# Patient Record
(demographics unavailable — no encounter records)

---

## 2024-10-09 NOTE — HISTORY OF PRESENT ILLNESS
[FreeTextEntry1] : 69 YO M seen  5/9/2023 as NPT for a several year onset of phimosis. He states that this has affected his urination, specifically weak stream,  but does not affect his erections. He denies any associated pain. He was advised to get a circumcision or dorsal slit procedure from a prior evaluation. He denies dysuria and gross hematuria. He denies history of kidney stones.  amlodipine, hctz, Losartan, rosuvastatin for HTN, cholesterol. BP high in the office, normal by history this AM, Patient denies any headache, dizziness or visual issues today. UA trace RBC IPSS 1 DARRION 18 ESTELLE 0  cc PSA: 4/25/23--1.84  The patient denies fevers, chills, nausea and or vomiting and no unexplained weight loss.  All pertinent parts of the patient PFSH (past medical, family and social histories), laboratory, radiological studies and physician notes were reviewed prior to starting the face to face portion of the  visit. Questionnaire results were discussed with patient. We discussed the physical exam findings, and he was noted to have severe phimosis. I recommended a circumcision and he will consider this and let us know if he would like to proceed. We discussed the indications for, risks, alternatives and chances for success with this approach. Regarding the microhematuria identified on UA dip today, a urine C+S and cytology were sent. If there are no new problems, I would like to see him in 3 months to reassess. 5/31/2023 Circumcision at [facility] today, tolerated well, significant adhesions to the glans and obliterated and adherent phrenulum.  Patient seen 6/6/2023 for post op visit. He is doing well postoperatively, incisions healing well. I advised that he continue to put antibiotic ointment around the incision. He is travelling for a few months and will follow up when he returns. C+S sent. C+S negative.  Patient seen 11/15/2023 for 6 month FU. He has no complaints and denied any gross hematuria.  UA trace RBC, trace protein. IPSS 3 Findings today confirm good surgical result from his circumcision earlier in the year. There is no residual phimosis. As for the urine today trace blood was also seen on dipstick today. I sent this for microscopic examination culture and cytology. If these return normal then we will follow-up in 6 months to reassess. At that time he will also be due for his yearly SIS and PSA determination. Urine cytology negative, results to patient by VM, if C+S also negative, then FU as planned 6 months. C+S > 100K Coag negative Staph. Patient is asymptomatic, no treatment indicated, FU as planned 6 months.   Patient seen 5/22/2024 to reassess. He told me that he recently was evaluated by cardiology in [place] and this evaluation was unremarkable. He has been having nocturia x 3-5 without any gross hematuria or dysuria, and feels that he urinates better during the day with a strong stream and an adequate volume. UA tr WBC IPSS 6  ESTELLE 0 DARRION 8 Evaluation today does not present to immediate reason for the patient's marked nocturia. We discussed further noninvasive testing with uroflowmetry and pelvic ultrasound and I recommended that he proceed with this. He will be traveling to several countries between now and the next 6 months and would like to delay such an evaluation until November. Since his symptoms are minimal and slightly tolerable, I am in agreement with this approach and plans were made for follow-up visit in November. In the meanwhile urine sent for culture blood for PSA. We will review these by phone and act appropriately.  PSA 2.45, up slightly from 1.84 on 4/25/2023, but still in the normal range. C+S >100K Coag negative Staph. Unable to give results to patient by phone or VM, we will repeat PSA when he returns as planned in November 2024.  Patient seen TODAY 10/9/2024 to reassess.

## 2025-05-16 NOTE — HISTORY OF PRESENT ILLNESS
[FreeTextEntry1] : 69 YO M seen  5/9/2023 as NPT for a several year onset of phimosis. He states that this has affected his urination, specifically weak stream,  but does not affect his erections. He denies any associated pain. He was advised to get a circumcision or dorsal slit procedure from a prior evaluation. He denies dysuria and gross hematuria. He denies history of kidney stones.  amlodipine, hctz, Losartan, rosuvastatin for HTN, cholesterol. BP high in the office, normal by history this AM, Patient denies any headache, dizziness or visual issues today. UA trace RBC IPSS 1 DARRION 18 ESTELLE 0  cc PSA: 4/25/23--1.84  The patient denies fevers, chills, nausea and or vomiting and no unexplained weight loss.  All pertinent parts of the patient PFSH (past medical, family and social histories), laboratory, radiological studies and physician notes were reviewed prior to starting the face to face portion of the  visit. Questionnaire results were discussed with patient. We discussed the physical exam findings, and he was noted to have severe phimosis. I recommended a circumcision and he will consider this and let us know if he would like to proceed. We discussed the indications for, risks, alternatives and chances for success with this approach. Regarding the microhematuria identified on UA dip today, a urine C+S and cytology were sent. If there are no new problems, I would like to see him in 3 months to reassess. 5/31/2023 Circumcision at [facility] today, tolerated well, significant adhesions to the glans and obliterated and adherent phrenulum.  Patient seen 6/6/2023 for post op visit. He is doing well postoperatively, incisions healing well. I advised that he continue to put antibiotic ointment around the incision. He is travelling for a few months and will follow up when he returns. C+S sent. C+S negative.  Patient seen 11/15/2023 for 6 month FU. He has no complaints and denied any gross hematuria.  UA trace RBC, trace protein. IPSS 3 Findings today confirm good surgical result from his circumcision earlier in the year. There is no residual phimosis. As for the urine today trace blood was also seen on dipstick today. I sent this for microscopic examination culture and cytology. If these return normal then we will follow-up in 6 months to reassess. At that time he will also be due for his yearly SIS and PSA determination. Urine cytology negative, results to patient by VM, if C+S also negative, then FU as planned 6 months. C+S > 100K Coag negative Staph. Patient is asymptomatic, no treatment indicated, FU as planned 6 months.   Patient seen 5/22/2024 to reassess. He told me that he recently was evaluated by cardiology in [place] and this evaluation was unremarkable. He has been having nocturia x 3-5 without any gross hematuria or dysuria, and feels that he urinates better during the day with a strong stream and an adequate volume. UA tr WBC IPSS 6  ESTELLE 0 DARRION 8 Evaluation today does not present to immediate reason for the patient's marked nocturia. We discussed further noninvasive testing with uroflowmetry and pelvic ultrasound and I recommended that he proceed with this. He will be traveling to several countries between now and the next 6 months and would like to delay such an evaluation until November. Since his symptoms are minimal and slightly tolerable, I am in agreement with this approach and plans were made for follow-up visit in November. In the meanwhile urine sent for culture blood for PSA. We will review these by phone and act appropriately.  PSA 2.45, up slightly from 1.84 on 4/25/2023, but still in the normal range. C+S >100K Coag negative Staph. Unable to give results to patient by phone or VM, we will repeat PSA when he returns as planned in November 2024.  Cancelled 10/9/2024  Patient seen TODAY 5/16/2025 to reassess. He tells me that he is urinating reasonably well but does have nocturia 3-4 times a night has a little lower urinary tract symptoms are minimal he is presently on no prostate medication.  He does take medication for high blood pressure which he gets from [place] and is a combination of medicines including a diuretic.  He also takes Mounjaro for weight loss. UA negative IPSS 5 Uroflow inaccurate: Vol 26ml. PELVIC US PVR 126ml, PROST 91gm. PSA 2.63 on 5/1/21025

## 2025-05-16 NOTE — PHYSICAL EXAM
[Normal Appearance] : normal appearance [General Appearance - In No Acute Distress] : no acute distress [Edema] : no peripheral edema [] : no respiratory distress [Abdomen Soft] : soft [Abdomen Tenderness] : non-tender [Urethral Meatus] : meatus normal [Penis Abnormality] : normal circumcised penis [Urinary Bladder Findings] : the bladder was normal on palpation [Epididymis] : the epididymides were normal [Testes Tenderness] : no tenderness of the testes [Testes Mass (___cm)] : there were no testicular masses [Prostate Tenderness] : the prostate was not tender [No Prostate Nodules] : no prostate nodules [Prostate Size ___ (0-4)] : prostate size [unfilled] (scale: 0-4) [Normal Station and Gait] : the gait and station were normal for the patient's age [Skin Turgor] : supple [No Focal Deficits] : no focal deficits [Oriented To Time, Place, And Person] : oriented to person, place, and time [Affect] : the affect was normal [Mood] : the mood was normal [Not Anxious] : not anxious

## 2025-05-16 NOTE — LETTER BODY
[Dear  ___] : Dear  [unfilled], [Courtesy Letter:] : I had the pleasure of seeing your patient, [unfilled], in my office today. [Please see my note below.] : Please see my note below. [Consult Closing:] : Thank you very much for allowing me to participate in the care of this patient.  If you have any questions, please do not hesitate to contact me. [FreeTextEntry3] : Best Regards,   Becky Rogers MD

## 2025-05-16 NOTE — ASSESSMENT
[FreeTextEntry1] : Evaluation today is consistent with significant nocturia and elevated residual bladder volume and an enlarged but benign feeling prostate gland.  Blood was sent for PSA and CBC.  We discussed treatment with alpha blocking agent such as tamsulosin 0.4 mg with the indications risks alternatives and chances for success at patient's request I ordered the medication for him and recommended that he come back within a month or 2 to reassess with a repeat flow and PVR evaluation.  However, he told me that he and his wife will be in [place] for the next 5 months and they will return in approximately 6 months.  At that time they will be followed by my partner Dr. Funk since I will be retiring 7/1/2025.